# Patient Record
Sex: FEMALE | Race: WHITE
[De-identification: names, ages, dates, MRNs, and addresses within clinical notes are randomized per-mention and may not be internally consistent; named-entity substitution may affect disease eponyms.]

---

## 2020-09-02 ENCOUNTER — APPOINTMENT (OUTPATIENT)
Dept: ORTHOPEDIC SURGERY | Facility: CLINIC | Age: 75
End: 2020-09-02
Payer: MEDICARE

## 2020-09-02 VITALS — TEMPERATURE: 97.5 F

## 2020-09-02 DIAGNOSIS — Z86.39 PERSONAL HISTORY OF OTHER ENDOCRINE, NUTRITIONAL AND METABOLIC DISEASE: ICD-10-CM

## 2020-09-02 DIAGNOSIS — Z78.9 OTHER SPECIFIED HEALTH STATUS: ICD-10-CM

## 2020-09-02 DIAGNOSIS — Z86.69 PERSONAL HISTORY OF OTHER DISEASES OF THE NERVOUS SYSTEM AND SENSE ORGANS: ICD-10-CM

## 2020-09-02 DIAGNOSIS — Z86.79 PERSONAL HISTORY OF OTHER DISEASES OF THE CIRCULATORY SYSTEM: ICD-10-CM

## 2020-09-02 DIAGNOSIS — Z87.39 PERSONAL HISTORY OF OTHER DISEASES OF THE MUSCULOSKELETAL SYSTEM AND CONNECTIVE TISSUE: ICD-10-CM

## 2020-09-02 PROBLEM — Z00.00 ENCOUNTER FOR PREVENTIVE HEALTH EXAMINATION: Status: ACTIVE | Noted: 2020-09-02

## 2020-09-02 PROCEDURE — 99204 OFFICE O/P NEW MOD 45 MIN: CPT

## 2020-09-02 PROCEDURE — 72070 X-RAY EXAM THORAC SPINE 2VWS: CPT

## 2020-09-02 PROCEDURE — 72100 X-RAY EXAM L-S SPINE 2/3 VWS: CPT

## 2020-09-03 PROBLEM — Z87.39 HISTORY OF ARTHRITIS: Status: RESOLVED | Noted: 2020-09-02 | Resolved: 2020-09-03

## 2020-09-03 PROBLEM — Z86.69 HISTORY OF NEUROPATHY: Status: RESOLVED | Noted: 2020-09-02 | Resolved: 2020-09-03

## 2020-09-03 PROBLEM — Z87.39 HISTORY OF OSTEOPOROSIS: Status: RESOLVED | Noted: 2020-09-02 | Resolved: 2020-09-03

## 2020-09-03 PROBLEM — Z78.9 NON-SMOKER: Status: ACTIVE | Noted: 2020-09-02

## 2020-09-03 PROBLEM — Z86.39 HISTORY OF HIGH CHOLESTEROL: Status: RESOLVED | Noted: 2020-09-02 | Resolved: 2020-09-03

## 2020-09-03 PROBLEM — Z86.79 HISTORY OF CARDIAC DISORDER: Status: RESOLVED | Noted: 2020-09-02 | Resolved: 2020-09-03

## 2020-09-03 NOTE — HISTORY OF PRESENT ILLNESS
[de-identified] : 75 year old female with adolescent idiopathic scoliosis who presents with back pain.  She has been doing PT for the last 13 years which kept her pain under control but more recently she has been having more pain despite continuing work with her PT.  She has had some episodes of left lumbar radiculopathy but that is not bothering her now.  She is interested in new exercise routines to help her back pain.  She denies any numbness, weakness, urinary retention, balance problems or fecal incontinence.  Activity makes the pain worse.  Rest makes it better.  She was diagnosed as an adolescent and was recommended a brace.

## 2020-09-03 NOTE — ASSESSMENT
[FreeTextEntry1] : 75 year old female with adolescent idiopathic scoliosis and back pain.  She has had some episodes of left lumbar radiculopathy but is otherwise neurologically intact.  She is eager to continue conservative treatment focusing on physical therapy routines.  We discussed physical therapy and she expressed interest in the Antony Method as well as the Schroth Method. She was given a referral to a physical therapist who specializes in these methods.  She will followup in 6 weeks.  She knows to call with any questions or concerns or if her symptoms acutely worsen.

## 2020-09-03 NOTE — PHYSICAL EXAM
[de-identified] : General: No acute distress, conversant, well-nourished.\par Head: Normocephalic, atraumatic\par Neck: trachea midline, FROM\par Heart: normotensive and normal rate and rhythm\par Lungs: No labored breathing\par Skin: No abrasions, no rashes, no edema\par Psych: Alert and oriented to person, place and time\par Extremities: no peripheral edema or digital cyanosis\par Gait: Normal gait. Can perform tandem gait.  \par Vascular: warm and well perfused distally, palpable distal pulses\par \par MSK:\par Spine:\par Visible severe scoliotic curve\par No tenderness to palpation.  No step-off, no deformity.\par \par NEURO EXAM:\par Sensation \par Left L2  -  2/2            \par Left L3  -  2/2\par Left L4  -  2/2\par Left L5  -  2/2\par Left S1  -  2/2\par \par Right L2  -  2/2            \par Right L3  -  2/2\par Right L4  -  2/2\par Right L5  -  2/2\par Right S1  -  2/2\par \par Motor: \par Left L2 (hip flexion)                            5/5                \par Left L3 (knee extension)                   5/5                \par Left L4 (ankle dorsiflexion)                 5/5                \par Left L5 (long toe extensor)                5/5                \par Left S1 (ankle plantar flexion)           5/5\par \par Right L2 (hip flexion)                            5/5                \par Right L3 (knee extension)                   5/5                \par Right L4 (ankle dorsiflexion)                 5/5                \par Right L5 (long toe extensor)                5/5                \par Right S1 (ankle plantar flexion)           5/5\par \par Reflexes: Normal and symmetric\par Negative clonus.  Down-going Babinski.   [de-identified] : Thoracic AP and lateral radiographs obtained in the office today shows no fracture or dislocation. Severe structural thoracolumbar curve.\par \par Lumbar AP and lateral radiographs obtained in the office today shows no fracture or dislocation.  Severe structural thoracolumbar curve.  Lumbar spondylosis. Loss of height at L5-S1\par

## 2020-09-23 DIAGNOSIS — M41.9 SCOLIOSIS, UNSPECIFIED: ICD-10-CM

## 2020-09-23 DIAGNOSIS — M54.9 DORSALGIA, UNSPECIFIED: ICD-10-CM
